# Patient Record
Sex: FEMALE | Employment: UNEMPLOYED | URBAN - METROPOLITAN AREA
[De-identification: names, ages, dates, MRNs, and addresses within clinical notes are randomized per-mention and may not be internally consistent; named-entity substitution may affect disease eponyms.]

---

## 2020-01-13 PROBLEM — M41.25 OTHER IDIOPATHIC SCOLIOSIS, THORACOLUMBAR REGION: Status: ACTIVE | Noted: 2020-01-13

## 2020-01-13 PROBLEM — M54.50 ACUTE MIDLINE LOW BACK PAIN WITHOUT SCIATICA: Status: ACTIVE | Noted: 2020-01-13

## 2020-01-13 PROBLEM — M25.512 ACUTE PAIN OF LEFT SHOULDER: Status: ACTIVE | Noted: 2020-01-13

## 2020-02-04 ENCOUNTER — HOSPITAL ENCOUNTER (OUTPATIENT)
Dept: PHYSICAL THERAPY | Age: 25
Discharge: HOME | End: 2020-02-04
Payer: MEDICAID

## 2020-02-04 DIAGNOSIS — M25.512 ACUTE PAIN OF LEFT SHOULDER: ICD-10-CM

## 2020-02-04 DIAGNOSIS — M41.25 OTHER IDIOPATHIC SCOLIOSIS, THORACOLUMBAR REGION: ICD-10-CM

## 2020-02-04 DIAGNOSIS — M54.50 ACUTE MIDLINE LOW BACK PAIN WITHOUT SCIATICA: ICD-10-CM

## 2020-02-04 PROCEDURE — 97110 THERAPEUTIC EXERCISES: CPT

## 2020-02-04 PROCEDURE — 97162 PT EVAL MOD COMPLEX 30 MIN: CPT

## 2020-02-04 PROCEDURE — 97140 MANUAL THERAPY 1/> REGIONS: CPT

## 2020-02-04 NOTE — THERAPY EVALUATION
Gearold Shone  : 1995  Primary: [place] Medicaid  Secondary:  [address] at [facility] Therapy  [address], [facility], [address]  [phone]   [phone]          OUTPATIENT PHYSICAL THERAPY:  Initial Assessment 2020   ICD-10: Treatment Diagnosis: M25.512  Pain in L shoulder  M54.5  Midline acute low back pain without sciatica  M41.25   Idiopathic scoliosis, thoracolumbar region  Precautions/Allergies:   Patient has no known allergies. TREATMENT PLAN:  Effective Dates: 2020 TO 2020 (90 days). Frequency/Duration: 1 time a week for 60 Day(s), and upon reassessment will adjust frequency and duration as progress indicates. MEDICAL/REFERRING DIAGNOSIS:  Acute midline low back pain without sciatica [M54.5]  Other idiopathic scoliosis, thoracolumbar region [M41.25]  Acute pain of left shoulder [M25.512]   DATE OF ONSET: shoulder pain about 9  Months ago  REFERRING PHYSICIAN: Brittney Chavarria MD MD Orders: Melania Caldwell and Treat    Return MD Appointment: next week     INITIAL ASSESSMENT:  Ms. Sanjuana Schwarz presents with chronic back pain from idiopathic scoliosis in the thoracolumbar region, and with acute L shoulder pain, that is more of an urgent problem for her. The shoulder pain is from no apparent or specific injury but has been getting worse for about 9 months. She did well with initial PT treatment focusing on improving painfree active movement. She is expected to continue to benefit from skilled PT to help improve active and functional movement, decreasing pain and promoting return to previous activity level. PROBLEM LIST (Impacting functional limitations):  1. Decreased Strength  2. Decreased ADL/Functional Activities  3. Decreased Ambulation Ability/Technique  4. Decreased Balance  5. Increased Pain  6. Decreased Activity Tolerance  7. Decreased Flexibility/Joint Mobility  8. Decreased [place] with Home Exercise Program  9.    INTERVENTIONS PLANNED: (Treatment may consist of any combination of the following)  1. Home Exercise Program (HEP)  2. Manual Therapy  3. Neuromuscular Re-education/Strengthening  4. Range of Motion (ROM)  5. Therapeutic Exercise/Strengthening  6. Modalities as needed and appropriate, including Aquatics and taping  7. GOALS: (Goals have been discussed and agreed upon with patient.)  Short-Term Functional Goals: Time Frame: 4 weeks  1. Decrease pain to allow AROM through functional range for ADLs   2. Improve function to allow light lifting and reaching with no more than min pain  3. Improve strength L shoulder by 1/2 grade for completion of ADLs without pain  Discharge Goals: Time Frame: 10 weeks  1. Decrease pain to allow pt to sleep through the night without shoulder pain  2. Pt to be able to complete household and child-care activities without pain  3. Pt able to perform light lifting overhead   4. Pt to be independent in comprehensive HEP to maintain gains made in PT    OUTCOME MEASURE:   Tool Used: Disabilities of the Arm, Shoulder and Hand (DASH) Questionnaire - Quick Version  Score:  Initial: 49/55  Most Recent: X/55 (Date: -- )   Interpretation of Score: The DASH is designed to measure the activities of daily living in person's with upper extremity dysfunction or pain. Each section is scored on a 1-5 scale, 5 representing the greatest disability. The scores of each section are added together for a total score of 55. MEDICAL NECESSITY:   · Patient is expected to demonstrate progress in strength, range of motion and functional technique  ·  to decrease pain and promote return of normal function at the shoulder. · .  REASON FOR SERVICES/OTHER COMMENTS:  · Patient continues to require skilled intervention due to severe L shoulder pain that interferes with ADLs and all UE tasks.   · .  Total Duration:  PT Patient Time In/Time Out  Time In: 0915  Time Out: 2934    Rehabilitation Potential For Stated Goals: Good  Regarding [employer] therapy, I certify that the treatment plan above will be carried out by a therapist or under their direction. Thank you for this referral,  Freddy Jovel, PT     Referring Physician Signature: José Antonio Disla MD _______________________________ Date _____________                                                                                                 Information below was gathered on Initial Assessment--   2-4-2020  PAIN/SUBJECTIVE:   Initial: Pain Intensity 1: 6  Post Session:  4/10   HISTORY:   History of Injury/Illness (Reason for Referral):   Pt reports that she has idiopathic scoliosis that has caused her back pain since about age 16-14, and she has had on-going treatment with massage and chiropractic for many yrs for that. She has had this L shoulder pain for about 9 months, from no specific reason that she can identify. xrays were negative. She describes the pain as very sharp. She does have occasional pain down the forearm to the hand but very intermittently. Pain does not seem to be related to any cervical limitations or motions. She said her goal is to be able to hug and carry her 2 yo daughter ( 35#) without pain. Past Medical History/Comorbidities:   Ms. Steven Velasco  has a past medical history of Chronic back pain and Scoliosis. Ms. Steven Velasco  has no past surgical history on file. Social History/Living Environment:     Lives with family in [facility]. Had 2 yo daughter. Has only been in [place] 3 months  Prior Level of Function/Work/Activity:  Not working  Dominant Side:         RIGHT    Personal Factors:          Age:  22 y.o. Ambulatory/Rehab Services H2 Model Falls Risk Assessment   Risk Factors:       No Risk Factors Identified Ability to Rise from Chair:       (0)  Ability to rise in a single movement   Falls Prevention Plan:       No modifications necessary   Total: (5 or greater = High Risk): 0   ©2010 [facility]. All Rights Reserved.  [place] Patent #[id number]. Federal Law prohibits the replication, distribution or use without written permission from [facility]   Current Medications:       Current Outpatient Medications:     gabapentin (NEURONTIN) 100 mg capsule, Take 1 Cap by mouth three (3) times daily. , Disp: 90 Cap, Rfl: 2    codeine-butalbital-aspirin-caffeine (FIORINAL-CODEINE #3) 61--40 mg per capsule, Take  by mouth every six (6) hours as needed for Pain., Disp: , Rfl:    Date Last Reviewed:  2/4/2020     Number of Personal Factors/Comorbidities that affect the Plan of Care: 1-2: MODERATE COMPLEXITY   EXAMINATION:   Observation:  Pt is holding L arm close to the side, and protected. She moves easily otherwise. No difficulty in sit to supine or supine to sit. Scoliosis is minor with obvious R mid thoracic rib hump, slight concavity to the R.       ROM:          PROM L shoulder in supine is WDL. Min limitation in IR --pt has ~ 55 ° passive IR in supine. Strength:            Date:  2-4 Date:   Date:     Activity/Exercise Parameters Parameters Parameters   L shoulder flex 3-     Shoulder abd 3     extn 4     ER 3     IR 3+     Elbow flexion 4-     Elbow extn 4+     pronation 4     supination 4-     Wrist extn 4       Neurological Screen:        Sensation: pt reported very occasional, intermittent tingling in the L hand, like if she sleeps on L side. Functional Mobility:         Gait/Ambulation:  Not affected by this dx        Stairs:  NT         Balance:          NT             Body Structures Involved:  1. Thoracic Cage  2. Bones  3. Joints  4. Muscles  5. Ligaments Body Functions Affected:  1. Mental  2. Sensory/Pain  3. Neuromusculoskeletal  4. Movement Related Activities and Participation Affected:  1. General Tasks and Demands  2. Self Care  3. Domestic Life  4.  Community, Social and [place]   Number of elements (examined above) that affect the Plan of Care: 3: MODERATE COMPLEXITY   CLINICAL PRESENTATION:   Presentation: Evolving clinical presentation with changing clinical characteristics: MODERATE COMPLEXITY   CLINICAL DECISION MAKING:   Use of outcome tool(s) and clinical judgement create a POC that gives a: Questionable prediction of patient's progress: MODERATE COMPLEXITY

## 2020-02-04 NOTE — PROGRESS NOTES
Jayla Medranouer  : 1995  Primary: [place]iel Jacobs Medicaid  Secondary:  [address]  at [facility] Therapy  [address], [address]  [phone]   [phone]        OUTPATIENT PHYSICAL THERAPY: Daily Treatment Note 2020  Visit Count:  1    ICD-10: Treatment Diagnosis: M25.512  Pain in L shoulder  M54.5  Midline acute low back pain without sciatica  M41.25   Idiopathic scoliosis, thoracolumbar region    Pre-treatment Symptoms/Complaints:  Pt reports that her L shoulder has been hurting her, severely , for about 9 months. Pain: Initial: Pain Intensity 1: 6  Post Session:  4/10   Medications Last Reviewed:  2020  Updated Objective Findings:  See evaluation note from today  TREATMENT:     Evaluation   (X)    Therapeutic Exercise   ( 15 min  ):  To decrease pain, improve flexibility and motion, and increase strength. Will provide verbal and manual cues as needed to ensure proper performance of the exercises. Will increase range of motion, resistance and intensity as pt tolerates. Pt used pulley x 5 min for self AAROM in sitting and standing  Pt did AAROM in supine for flexion and rotations. Needed min physical guidance for full flex and ER  Pt did ex in bent-over position:  Flex, extn, horiz abd    Manual Therapy ( 15 min  ):     pt received manual therapy for PROM, gentle joint capsule stretch joint mobs for L shoulder in supine  Was able to get full flex and ER without much pain. IR in supine is moderately limited by pain. [facility]  Treatment/Session Summary:    · Response to Treatment:  Pt did well in therapy today. She was surprised at how much we could move her shoulder below her pain threshold. Initially she wanted a sling, but soon came to see that movement made it feel better. Unclear at present the source of the pain, but she left feeling better than when she came to therapy.   Discussed that she should continue with massage therapy as she has been for management of her scoliosis-related back pain. .  · Communication/Consultation:  None today  · Equipment provided today:  gave pt pulley and HEP  · Recommendations/Intent for next treatment session: Next visit will focus on decreasing pain and improving functional use of L shoulder.     Total Treatment Billable Duration:   60 min    AZUL Weaver 1,  TE 1       Effective Dates: 2/4/2020 TO 5/1/2020  PT Patient Time In/Time Out  Time In: 0915  Time Out: [address] [facility], PT    Future Appointments   Date Time Provider Patricia Hennessy   2/11/2020  2:00 PM Kym Trevino, PT [facility]   7/6/2020  9:00 AM Bo Casey MD [facility]

## 2020-02-11 ENCOUNTER — HOSPITAL ENCOUNTER (OUTPATIENT)
Dept: PHYSICAL THERAPY | Age: 25
Discharge: HOME | End: 2020-02-11
Payer: MEDICAID

## 2020-02-11 NOTE — PROGRESS NOTES
Sachin Lane  : 1995  Primary: [place]v Beard Medicaid  Secondary:  [facility] Therapy  [address] [place] [address], [facility] [address]  [phone]   [phone]        OUTPATIENT DAILY NOTE    NAME/AGE/GENDER: Sachin Lane is a 22 y.o. female. DATE: 2020    Ms. Richard Pac for today's appointment due to an out of town emergency.   Beba Louise, PT

## 2020-05-13 NOTE — THERAPY DISCHARGE
Pina Durham  : 1995  Primary: [place]andy Aragon Medicaid  Secondary:  [address] at [facility] Therapy  [address], [facility], [address]  [phone]   [phone]          OUTPATIENT PHYSICAL THERAPY:  DISCONTINUATION SUMMARY   ICD-10: Treatment Diagnosis: M25.512  Pain in L shoulder  M54.5  Midline acute low back pain without sciatica  M41.25   Idiopathic scoliosis, thoracolumbar region  Precautions/Allergies:   Patient has no known allergies. TREATMENT PLAN:  Effective Dates: 2020 TO 2020 (90 days). Frequency/Duration: Physical therapy has been discontinued. MEDICAL/REFERRING DIAGNOSIS:  Acute midline low back pain without sciatica [M54.5]  Other idiopathic scoliosis, thoracolumbar region [M41.25]  Acute pain of left shoulder [M25.512]   DATE OF ONSET: shoulder pain about 9  Months ago  REFERRING PHYSICIAN: Daniel Pereira MD MD Orders: Kindra Gordon and Treat    Return MD Appointment: next week     INITIAL ASSESSMENT:  Ms. Alejandra Salvador presents with chronic back pain from idiopathic scoliosis in the thoracolumbar region, and with acute L shoulder pain, that is more of an urgent problem for her. The shoulder pain is from no apparent or specific injury but has been getting worse for about 9 months. She did well with initial PT treatment focusing on improving painfree active movement. She is expected to continue to benefit from skilled PT to help improve active and functional movement, decreasing pain and promoting return to previous activity level. DISCONTINUATION SUMMARY:   Pt attended only 1 visit to PT for treatment of L shoulder and thoracic back pain. She cancelled an appt due to out of town emergency and never made any additional appts. Only known status is as reported on 2-4. Physical therapy has been discontinued. PROBLEM LIST (Impacting functional limitations):  1. Decreased Strength  2. Decreased ADL/Functional Activities  3.  Decreased Ambulation Ability/Technique  4. Decreased Balance  5. Increased Pain  6. Decreased Activity Tolerance  7. Decreased Flexibility/Joint Mobility  8. Decreased [place] with Home Exercise Program  9. GOALS: (Goals have been discussed and agreed upon with patient.)  Short-Term Functional Goals: Time Frame: 4 weeks-- goals not readdressed due to lack of follow up  1. Decrease pain to allow AROM through functional range for ADLs   2. Improve function to allow light lifting and reaching with no more than min pain  3. Improve strength L shoulder by 1/2 grade for completion of ADLs without pain  Discharge Goals: Time Frame: 10 weeks  1. Decrease pain to allow pt to sleep through the night without shoulder pain  2. Pt to be able to complete household and child-care activities without pain  3. Pt able to perform light lifting overhead   4. Pt to be independent in comprehensive HEP to maintain gains made in PT    OUTCOME MEASURE:   Tool Used: Disabilities of the Arm, Shoulder and Hand (DASH) Questionnaire - Quick Version  Score:  Initial: 49/55  Most Recent: X/55 (Date: -- )   Interpretation of Score: The DASH is designed to measure the activities of daily living in person's with upper extremity dysfunction or pain. Each section is scored on a 1-5 scale, 5 representing the greatest disability. The scores of each section are added together for a total score of 55. Thank you for this referral,  Reed Swift, PT     Referring Physician Signature: Crystal Jeffers., MD No Signature is Required for this note.                                                                                                     Information below was gathered on Initial Assessment--   2-4-2020  PAIN/SUBJECTIVE:   Initial: Pain Intensity 1: 6  Post Session:  4/10   HISTORY:   History of Injury/Illness (Reason for Referral):   Pt reports that she has idiopathic scoliosis that has caused her back pain since about age 16-14, and she has had on-going treatment with massage and chiropractic for many yrs for that. She has had this L shoulder pain for about 9 months, from no specific reason that she can identify. xrays were negative. She describes the pain as very sharp. She does have occasional pain down the forearm to the hand but very intermittently. Pain does not seem to be related to any cervical limitations or motions. She said her goal is to be able to hug and carry her 2 yo daughter ( 35#) without pain. Past Medical History/Comorbidities:   Ms. Елена Hurst  has a past medical history of Chronic back pain and Scoliosis. Ms. Елена Hurst  has no past surgical history on file. Social History/Living Environment:     Lives with family in [facility]. Had 2 yo daughter. Has only been in [place] 3 months  Prior Level of Function/Work/Activity:  Not working  Dominant Side:         RIGHT    Personal Factors:          Age:  22 y.o. Ambulatory/Rehab Services H2 Model Falls Risk Assessment   Risk Factors:       No Risk Factors Identified Ability to Rise from Chair:       (0)  Ability to rise in a single movement   Falls Prevention Plan:       No modifications necessary   Total: (5 or greater = High Risk): 0   ©2010 [facility] [employer]. All Rights Reserved. [facility] Patent #[id number]. Federal Law prohibits the replication, distribution or use without written permission from [facility] [employer]   Current Medications:       Current Outpatient Medications:     gabapentin (NEURONTIN) 100 mg capsule, Take 1 Cap by mouth three (3) times daily. , Disp: 90 Cap, Rfl: 2    codeine-butalbital-aspirin-caffeine (FIORINAL-CODEINE #3) 40--40 mg per capsule, Take  by mouth every six (6) hours as needed for Pain., Disp: , Rfl:    Date Last Reviewed:  5/13/2020     Number of Personal Factors/Comorbidities that affect the Plan of Care: 1-2: MODERATE COMPLEXITY   EXAMINATION:   Observation:  Pt is holding L arm close to the side, and protected.   She moves easily otherwise. No difficulty in sit to supine or supine to sit. Scoliosis is minor with obvious R mid thoracic rib hump, slight concavity to the R.       ROM:          PROM L shoulder in supine is WDL. Min limitation in IR --pt has ~ 55 ° passive IR in supine. Strength:            Date:  2-4 Date:   Date:     Activity/Exercise Parameters Parameters Parameters   L shoulder flex 3-     Shoulder abd 3     extn 4     ER 3     IR 3+     Elbow flexion 4-     Elbow extn 4+     pronation 4     supination 4-     Wrist extn 4       Neurological Screen:        Sensation: pt reported very occasional, intermittent tingling in the L hand, like if she sleeps on L side. Functional Mobility:         Gait/Ambulation:  Not affected by this dx        Stairs:  NT         Balance:          NT             Body Structures Involved:  1. Thoracic Cage  2. Bones  3. Joints  4. Muscles  5. Ligaments Body Functions Affected:  1. Mental  2. Sensory/Pain  3. Neuromusculoskeletal  4. Movement Related Activities and Participation Affected:  1. General Tasks and Demands  2. Self Care  3. Domestic Life  4.  Community, Social and [place]   Number of elements (examined above) that affect the Plan of Care: 3: MODERATE COMPLEXITY   CLINICAL PRESENTATION:   Presentation: Evolving clinical presentation with changing clinical characteristics: MODERATE COMPLEXITY   CLINICAL DECISION MAKING:   Use of outcome tool(s) and clinical judgement create a POC that gives a: Questionable prediction of patient's progress: MODERATE COMPLEXITY